# Patient Record
Sex: FEMALE | Race: WHITE | Employment: FULL TIME | URBAN - METROPOLITAN AREA
[De-identification: names, ages, dates, MRNs, and addresses within clinical notes are randomized per-mention and may not be internally consistent; named-entity substitution may affect disease eponyms.]

---

## 2024-02-17 ENCOUNTER — HOSPITAL ENCOUNTER (EMERGENCY)
Age: 24
Discharge: HOME | End: 2024-02-18
Attending: EMERGENCY MEDICINE
Payer: COMMERCIAL

## 2024-02-17 DIAGNOSIS — S61.511A LACERATION OF RIGHT WRIST WITHOUT COMPLICATION, INITIAL ENCOUNTER: Primary | ICD-10-CM

## 2024-02-17 PROCEDURE — 99284 EMERGENCY DEPT VISIT MOD MDM: CPT

## 2024-02-17 PROCEDURE — 12001 RPR S/N/AX/GEN/TRNK 2.5CM/<: CPT

## 2024-02-18 VITALS
HEIGHT: 64 IN | HEART RATE: 88 BPM | TEMPERATURE: 98.1 F | SYSTOLIC BLOOD PRESSURE: 130 MMHG | OXYGEN SATURATION: 100 % | RESPIRATION RATE: 18 BRPM | DIASTOLIC BLOOD PRESSURE: 89 MMHG | BODY MASS INDEX: 25.64 KG/M2 | WEIGHT: 150.2 LBS

## 2024-02-18 PROCEDURE — 90471 IMMUNIZATION ADMIN: CPT | Performed by: EMERGENCY MEDICINE

## 2024-02-18 PROCEDURE — 90715 TDAP VACCINE 7 YRS/> IM: CPT | Performed by: EMERGENCY MEDICINE

## 2024-02-18 PROCEDURE — 2500000003 HC RX 250 WO HCPCS: Performed by: EMERGENCY MEDICINE

## 2024-02-18 PROCEDURE — 6360000002 HC RX W HCPCS: Performed by: EMERGENCY MEDICINE

## 2024-02-18 RX ORDER — LIDOCAINE HYDROCHLORIDE AND EPINEPHRINE 10; 10 MG/ML; UG/ML
20 INJECTION, SOLUTION INFILTRATION; PERINEURAL ONCE
Status: COMPLETED | OUTPATIENT
Start: 2024-02-18 | End: 2024-02-18

## 2024-02-18 RX ADMIN — TETANUS TOXOID, REDUCED DIPHTHERIA TOXOID AND ACELLULAR PERTUSSIS VACCINE, ADSORBED 0.5 ML: 5; 2.5; 8; 8; 2.5 SUSPENSION INTRAMUSCULAR at 00:36

## 2024-02-18 RX ADMIN — LIDOCAINE HYDROCHLORIDE,EPINEPHRINE BITARTRATE 20 ML: 10; .01 INJECTION, SOLUTION INFILTRATION; PERINEURAL at 00:18

## 2024-02-18 NOTE — ED PROVIDER NOTES
98.1 °F (36.7 °C)      Temp Source Oral      SpO2 100 %      Weight - Scale 68.1 kg (150 lb 3.2 oz)      Height 1.613 m (5' 3.5\")      Head Circumference       Peak Flow       Pain Score       Pain Loc       Pain Edu?       Excl. in GC?      HEAD: Normocephalic, atraumatic.  EYES:  Extraocular muscles are intact.  Conjunctivas are pink. Negative scleral icterus.   ENT:  Mucous membranes are moist. s.   NECK: Nontender and supple.   CHEST: normal effort  HEART:  Regular rate and rhythm.  MUSCULOSKELETAL:  Active range of motion of the upper and lower extremities. No edema. No swelling to right wrist. No bruising. Full flexion and extension at wrist and all fingers with good strength. Strong radial pulse.  NEUROLOGICAL: Awake, alert and oriented x 3. Power intact in the upper and lower extremities.   DERMATOLOGIC: No petechiae, rashes, or ecchymoses. 2.5cm linear laceration on ulnar side of volar wrist which gapes open. No tendons or foreign bodies seen in wound. No active bleeding. More superificial laceration which does not open central volar wrist measuring 1.5cm.       ED COURSE AND MEDICAL DECISION MAKING:    RECORD REVIEW: NONE    Radiology:  All plain films have been evaluated by myself. They may have been overread by radiologist as noted in chart. Other radiologic studies (i.e. CT, MRI, ultrasounds, etc ) have been interpreted by radiologist.     No orders to display       Labs:  No results found for this visit on 02/17/24.    Treatment in the department:  Patient received the following while in the ED:  Medications   lidocaine-EPINEPHrine 1 %-1:811402 injection 20 mL (20 mLs IntraDERmal Given by Other 2/18/24 0018)   Tetanus-Diphth-Acell Pertussis (BOOSTRIX) injection 0.5 mL (0.5 mLs IntraMUSCular Given 2/18/24 0036)     Monique Abdi or their surrogate had an opportunity to ask questions, and the risks, benefits, and alternatives were discussed. The wound located right wrist was prepped and draped to

## 2024-02-18 NOTE — DISCHARGE INSTRUCTIONS
Tylenol or Motrin for pain as needed.  Antibiotic ointment such as Polysporin or neomycin twice daily for the first 5 days and then leave dry.  Keep the wound clean dry and covered when at work or in areas where it may become dirty but otherwise leave open to air.  You may wash your hands normally but do not submerge in water.  Return for fever, redness, increased swelling, purulent drainage or other worsening symptoms.  Suture removal in approximately 10 days.